# Patient Record
(demographics unavailable — no encounter records)

---

## 2019-06-12 NOTE — RAD
CHEST ONE VIEW:

 

HISTORY:

Dyspnea.

 

COMPARISON:

None.

 

FINDINGS:

There is a subtle nodule in the right lung base, measuring approximately 5 mm.  Mild dextroscoliosis 
of the thoracic spine.  No consolidation, pneumothorax, or effusion.

 

No acute osseous abnormality.

 

IMPRESSION:

A 4-5 mm nodule projecting in the right lung base.  Given lack of comparison examinations, CT chest i
s recommended, non-emergently.

 

CODE LN

## 2019-06-12 NOTE — CT
CT ABDOMEN AND PELVIS WITH AND WITHOUT IV CONTRAST:



HISTORY: Abdominal pain, abnormal gallbladder ultrasound from earlier today.



COMPARISON: None



CORRELATION: Gallbladder ultrasound from earlier today.



FINDINGS:

There is a 5 mm nodule in the right lung base. The spleen, pancreas, adrenal glands are normal. Small
 cysts are seen in the kidneys. No calcified gallstones are noted.



There are numerous masses in the liver, majority of which are hypodense/heterogeneous, suspicious for
 metastatic disease. Some of these are consistent with cysts, the largest cyst measuring 6.3 cm in

the inferior right liver lobe. No abnormal biliary ductal dilatation is seen.



No free air is seen. A small amount of free fluid is seen in the pelvis. No lymphadenopathy is seen. 
A fibroid uterus is present. There are vascular calcifications without evidence of aneurysmal

dilatation of the abdominal aorta. There are degenerative changes and levoscoliosis of the lumbar spi
ne. No osteolytic or osteoblastic lesions are identified.



IMPRESSION: Findings are suspicious for liver metastases.



Reported By: Champ Murillo 

Electronically Signed:  6/12/2019 11:22 AM

## 2019-06-12 NOTE — ULT
RIGHT UPPER QUADRANT ULTRASOUND:

 

HISTORY:

Abdominal pain for five days.

 

FINDINGS:

Overall liver size is within normal limits.  There is a very heterogeneously nodular appearance to th
e liver with multiple hyperechoic and hypoechoic nodules within the liver.  Several of these are up t
o 3 cm in size.  There is one nodule in the left lobe, which is up to 4 cm in size.  Multiple cysts a
re noted within the liver, the largest in the right lobe, measuring 5.1 x 6.4 x 6.9 cm.  The common b
ile duct is 0.3 cm.  A small filling defect, nonmobile, within the gallbladder, is suspicious for a s
mall polyp.  The visualized pancreas and right kidney are unremarkable.

 

IMPRESSION:

1.  Multiple hyperechoic and hypoechoic nodules or nodular foci within the liver, suspicious for pote
ntial masses.  Follow-up abdomen and pelvic CT with and without intravenous contrast, with liver mass
/hemangioma protocol, is recommended for further assessment.

 

2.  Small, nonmobile, nodular density within the gallbladder, suspicious for a polyp.

 

3.  Normal appearing common bile duct.

 

4.  Several liver cysts.

 

CODE T

## 2019-06-15 NOTE — EKG
Test Reason : 

Blood Pressure : ***/*** mmHG

Vent. Rate : 100 BPM     Atrial Rate : 100 BPM

   P-R Int : 138 ms          QRS Dur : 064 ms

    QT Int : 332 ms       P-R-T Axes : 079 018 -11 degrees

   QTc Int : 428 ms

 

Normal sinus rhythm

Possible Left atrial enlargement

Nonspecific T wave abnormality

Abnormal ECG

 

Confirmed by INA MAO, АЛЕКСАНДР ([id number]),  PADMINI MASTERS ([id number]) on 6/15/2019 3:31:29 PM

 

Referred By:             Confirmed By:АЛЕКСАНДР BUCKLEY MD